# Patient Record
Sex: FEMALE | Race: WHITE | Employment: UNEMPLOYED | URBAN - METROPOLITAN AREA
[De-identification: names, ages, dates, MRNs, and addresses within clinical notes are randomized per-mention and may not be internally consistent; named-entity substitution may affect disease eponyms.]

---
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
## 2024-09-04 ENCOUNTER — PATIENT MESSAGE (OUTPATIENT)
Dept: PODIATRY | Facility: CLINIC | Age: 19
End: 2024-09-04
Payer: COMMERCIAL

## 2024-10-02 ENCOUNTER — OFFICE VISIT (OUTPATIENT)
Dept: PODIATRY | Facility: CLINIC | Age: 19
End: 2024-10-02
Payer: COMMERCIAL

## 2024-10-02 VITALS
SYSTOLIC BLOOD PRESSURE: 124 MMHG | WEIGHT: 141.13 LBS | BODY MASS INDEX: 26.65 KG/M2 | HEART RATE: 101 BPM | DIASTOLIC BLOOD PRESSURE: 85 MMHG | HEIGHT: 61 IN

## 2024-10-02 DIAGNOSIS — B35.1 ONYCHOMYCOSIS DUE TO DERMATOPHYTE: Primary | ICD-10-CM

## 2024-10-02 PROCEDURE — 3074F SYST BP LT 130 MM HG: CPT | Mod: CPTII,S$GLB,, | Performed by: STUDENT IN AN ORGANIZED HEALTH CARE EDUCATION/TRAINING PROGRAM

## 2024-10-02 PROCEDURE — 99203 OFFICE O/P NEW LOW 30 MIN: CPT | Mod: S$GLB,,, | Performed by: STUDENT IN AN ORGANIZED HEALTH CARE EDUCATION/TRAINING PROGRAM

## 2024-10-02 PROCEDURE — 3008F BODY MASS INDEX DOCD: CPT | Mod: CPTII,S$GLB,, | Performed by: STUDENT IN AN ORGANIZED HEALTH CARE EDUCATION/TRAINING PROGRAM

## 2024-10-02 PROCEDURE — 3079F DIAST BP 80-89 MM HG: CPT | Mod: CPTII,S$GLB,, | Performed by: STUDENT IN AN ORGANIZED HEALTH CARE EDUCATION/TRAINING PROGRAM

## 2024-10-02 PROCEDURE — 99999 PR PBB SHADOW E&M-EST. PATIENT-LVL III: CPT | Mod: PBBFAC,,, | Performed by: STUDENT IN AN ORGANIZED HEALTH CARE EDUCATION/TRAINING PROGRAM

## 2024-10-02 PROCEDURE — 1159F MED LIST DOCD IN RCRD: CPT | Mod: CPTII,S$GLB,, | Performed by: STUDENT IN AN ORGANIZED HEALTH CARE EDUCATION/TRAINING PROGRAM

## 2024-10-02 RX ORDER — CICLOPIROX 80 MG/ML
SOLUTION TOPICAL NIGHTLY
Qty: 6.6 ML | Refills: 11 | Status: SHIPPED | OUTPATIENT
Start: 2024-10-02

## 2024-10-02 NOTE — PROGRESS NOTES
Subjective:     Patient ID: Serena Naik is a 19 y.o. female.    Chief Complaint: Nail Care (Nail care , patient states no pain in feet)    Serena is a 19 y.o. female who presents to the clinic complaining of thick and discolored toenails on both feet. Serena is inquiring about treatment options. Has tried Kerasal, relates it did not help.    Review of Systems   Constitutional: Negative for chills, decreased appetite, diaphoresis and fever.   HENT:  Negative for congestion and hearing loss.    Cardiovascular:  Negative for chest pain, claudication, leg swelling and syncope.   Respiratory:  Negative for cough and shortness of breath.    Skin:  Positive for nail changes. Negative for color change, flushing, itching, poor wound healing and rash.   Musculoskeletal:  Negative for arthritis, back pain, joint pain and joint swelling.   Gastrointestinal:  Negative for nausea and vomiting.   Neurological:  Negative for focal weakness, paresthesias and weakness.   Psychiatric/Behavioral:  Negative for altered mental status. The patient is not nervous/anxious.         Objective:     Physical Exam  Constitutional:       General: She is not in acute distress.     Appearance: She is well-developed. She is not diaphoretic.   Cardiovascular:      Comments: Dorsalis pedis and posterior tibial pulses are within normal limits. Skin temperature is within normal limits. Toes are cool to touch and feet are warm proximally. Hair growth is within normal limits. Skin is normotrophic and without hyperpigmentation. No edema noted. No spider veins or varicosities noted, bilaterally.   Musculoskeletal:         General: No tenderness.      Comments: Adequate joint range of motion without pain, limitation, nor crepitation to bilateral feet and ankle joints. Muscle strength is 5/5 in all groups bilaterally.       Lymphadenopathy:      Comments: Negative lymphangitic streaking    Skin:     General: Skin is warm and dry.      Findings: No lesion.       Comments: Skin is warm and dry, no acute signs of infection noted. No open wounds, macerations or hyperkeratotic lesions, bilaterally.     Toenails are well trimmed and with mild dystrophic changes bilaterally     Right hallux and bilateral 5th digit toenails are thickened by 2-4 mm's, dystrophic, and are darkened in coloration with subungual fungal debris, bilaterally.    Neurological:      Mental Status: She is alert and oriented to person, place, and time.      Sensory: No sensory deficit.      Motor: No abnormal muscle tone.      Comments: Light touch within normal limits.    Psychiatric:         Behavior: Behavior normal.         Thought Content: Thought content normal.         Judgment: Judgment normal.           Assessment:      Encounter Diagnosis   Name Primary?    Onychomycosis due to dermatophyte Yes     Plan:     Serena was seen today for nail care.    Diagnoses and all orders for this visit:    Onychomycosis due to dermatophyte    Other orders  -     ciclopirox (PENLAC) 8 % Soln; Apply topically nightly.      I counseled the patient on her conditions, their implications and medical management.  Discussed importance of keeping feet dry and changing socks and shoes on a regular basis to prevent spread of toenail fungus. Discussed treatment options for fungal toenails including topical home remedies, topical over the counter and prescription medications as well as oral prescription medications and laser treatment. All pros and cons discussed of each treatment. Patient elects for topical treatment. Script dispensed.   I advised her on keeping nails neatly trimmed, removing all sharp and loose edges, filing the nail as necessary to prevent damage to nail plate and prevent unnecessary pulling of toenail. Also advised to avoid tight fitting shoes to prevent pressure related issues. Recommend shoes with wide toe box or open toed shoe to reduce pressure.   Return to clinic PRN

## 2024-10-07 ENCOUNTER — TELEPHONE (OUTPATIENT)
Dept: PODIATRY | Facility: CLINIC | Age: 19
End: 2024-10-07
Payer: COMMERCIAL

## 2024-10-07 ENCOUNTER — PATIENT MESSAGE (OUTPATIENT)
Dept: PODIATRY | Facility: CLINIC | Age: 19
End: 2024-10-07
Payer: COMMERCIAL

## 2024-10-07 NOTE — TELEPHONE ENCOUNTER
----- Message from Rosa sent at 10/7/2024  3:04 PM CDT -----  Contact: Yoana  Type:  Needs Medical Advice    Who Called: pt mother   Symptoms (please be specific): pt needs a  verification of transaction, date of service provider, description of service amount please call pt mother so she can  needs a health care card    Would the patient rather a call back or a response via [facility]? call  Best Call Back Number:  [phone]  Additional Information:

## 2024-10-07 NOTE — TELEPHONE ENCOUNTER
Called pt back, no answer, left message to please call me back to let me know when is a good time to call

## 2025-01-29 ENCOUNTER — PATIENT MESSAGE (OUTPATIENT)
Dept: PODIATRY | Facility: CLINIC | Age: 20
End: 2025-01-29
Payer: COMMERCIAL

## 2025-06-10 ENCOUNTER — TELEPHONE (OUTPATIENT)
Dept: PODIATRY | Facility: CLINIC | Age: 20
End: 2025-06-10
Payer: COMMERCIAL

## 2025-06-10 NOTE — TELEPHONE ENCOUNTER
Called and spoke to her mother, Yoana, she would like to take the place of her daughter's appt time and day.  I informed her that is not a problem and moved her into her daughter's appt time and day.  Patient does not have any more questions at this time.     Jazmyn